# Patient Record
Sex: FEMALE | Race: BLACK OR AFRICAN AMERICAN | Employment: UNEMPLOYED | URBAN - METROPOLITAN AREA
[De-identification: names, ages, dates, MRNs, and addresses within clinical notes are randomized per-mention and may not be internally consistent; named-entity substitution may affect disease eponyms.]

---

## 2020-11-23 ENCOUNTER — CLINICAL DOCUMENTATION (OUTPATIENT)
Dept: GENERAL RADIOLOGY | Age: 40
End: 2020-11-23

## 2021-04-22 ENCOUNTER — HOSPITAL ENCOUNTER (EMERGENCY)
Age: 41
Discharge: HOME | End: 2021-04-22
Attending: EMERGENCY MEDICINE
Payer: MEDICARE

## 2021-04-22 VITALS
BODY MASS INDEX: 28.35 KG/M2 | WEIGHT: 160 LBS | TEMPERATURE: 98.3 F | OXYGEN SATURATION: 95 % | RESPIRATION RATE: 16 BRPM | SYSTOLIC BLOOD PRESSURE: 160 MMHG | HEART RATE: 77 BPM | DIASTOLIC BLOOD PRESSURE: 104 MMHG | HEIGHT: 63 IN

## 2021-04-22 DIAGNOSIS — R03.0 ELEVATED BLOOD PRESSURE READING: ICD-10-CM

## 2021-04-22 DIAGNOSIS — H60.391 INFECTIVE OTITIS EXTERNA OF RIGHT EAR: Primary | ICD-10-CM

## 2021-04-22 PROCEDURE — 99284 EMERGENCY DEPT VISIT MOD MDM: CPT

## 2021-04-22 PROCEDURE — 6370000000 HC RX 637 (ALT 250 FOR IP): Performed by: EMERGENCY MEDICINE

## 2021-04-22 RX ORDER — NEOMYCIN SULFATE, POLYMYXIN B SULFATE AND HYDROCORTISONE 10; 3.5; 1 MG/ML; MG/ML; [USP'U]/ML
3 SUSPENSION/ DROPS AURICULAR (OTIC) ONCE
Status: COMPLETED | OUTPATIENT
Start: 2021-04-22 | End: 2021-04-22

## 2021-04-22 RX ADMIN — NEOMYCIN SULFATE, POLYMYXIN B SULFATE AND HYDROCORTISONE 3 DROP: 10; 3.5; 1 SUSPENSION/ DROPS AURICULAR (OTIC) at 06:05

## 2021-04-22 ASSESSMENT — ENCOUNTER SYMPTOMS
SHORTNESS OF BREATH: 0
COUGH: 0
FACIAL SWELLING: 1
SORE THROAT: 0
TROUBLE SWALLOWING: 0

## 2021-04-22 NOTE — ED PROVIDER NOTES
[address]  EMERGENCY DEPARTMENT ENCOUNTER      Pt Name: Tory Rodriguez  MRN: [id number]  [place] 1980  Date of evaluation: 4/22/21      CHIEF COMPLAINT       Chief Complaint   Patient presents with    Facial Swelling     right ear and face     HISTORY OF PRESENT ILLNESS   HPI 47 y.o. female presents with c/o right ear pain and swelling. Symptoms have been progressively worsening over the last few days. Symptoms rated as severe in severity, constant in course, progressively worsening. Patient did have her right upper molar pulled about 2 weeks ago, but she denies any throat swelling or pain. REVIEW OF SYSTEMS       Review of Systems   Constitutional: Negative for fever. HENT: Positive for ear pain and facial swelling. Negative for dental problem, sore throat and trouble swallowing. Respiratory: Negative for cough and shortness of breath. PAST MEDICAL HISTORY   History reviewed. No pertinent past medical history. SURGICAL HISTORY     History reviewed. No pertinent surgical history. CURRENT MEDICATIONS       Previous Medications    NAPROXEN (NAPROSYN) 500 MG TABLET    Take 1 tablet by mouth 2 times daily for 10 days. OXYCODONE-ACETAMINOPHEN (PERCOCET) 5-325 MG PER TABLET    Take 1 tablet by mouth every 6 hours as needed for Pain for up to 15 doses. ALLERGIES     has No Known Allergies. FAMILY HISTORY     has no family status information on file. SOCIAL HISTORY      reports that she has been smoking cigars. She has been smoking about 0.50 packs per day. She has never used smokeless tobacco. She reports current alcohol use. She reports current drug use. Drug: Marijuana. PHYSICAL EXAM     INITIAL VITALS: BP (!) 160/104   Pulse 77   Temp 98.3 °F (36.8 °C) (Oral)   Resp 16   Ht 5' 3\" (1.6 m)   Wt 160 lb (72.6 kg)   SpO2 95%   BMI 28.34 kg/m²   General: NAD  Head: NCAT  ENT: External ear canal on the right is edematous and occluded. No mastoid tenderness. There is some swelling in the preauricular area. Oropharyngeal area is nonerythematous or edematous. Neck: There is no adenopathy or edema. Cardiovascular RRR  Pulmonary: CTA  Neuro: ANO x3 fluent speech ambulatory with a normal gait  MEDICAL DECISION MAKING:     MDM  47 y.o. female presenting with an infective otitis externa. We will put her on Cortisporin. There is no sign of mastoiditis. She did have a dental procedure a few weeks ago, but she denies any symptoms of throat swelling or pain. D/w pt treatment plan, warning precautions for prompt ED return and importance of close OP FU, she verbalizes understanding and agrees with the treatment plan. DIAGNOSTIC RESULTS       EMERGENCY DEPARTMENT COURSE:   Vitals:    Vitals:    04/22/21 0539 04/22/21 0546   BP:  (!) 160/104   Pulse: 77    Resp: 16    Temp: 98.3 °F (36.8 °C)    TempSrc: Oral    SpO2: 95%    Weight: 160 lb (72.6 kg)    Height: 5' 3\" (1.6 m)        The patient was given the following medications while in the emergency department:  Orders Placed This Encounter   Medications    neomycin-polymyxin-hydrocortisone (CORTISPORIN) otic suspension 3 drop    neomycin-polymyxin-hydrocortisone (CORTISPORIN) 3.5-91667-6 otic solution     Sig: Place 4 drops into the right ear 3 times daily for 10 days Instill into r. ear     Dispense:  1 Bottle     Refill:  0     -------------------------  CRITICAL CARE:   CONSULTS: None  PROCEDURES: Procedures     FINAL IMPRESSION      1. Infective otitis externa of right ear    2.  Elevated blood pressure reading          DISPOSITION/PLAN   DISPOSITION Decision To Discharge 04/22/2021 05:57:36 AM      PATIENT REFERRED TO:  Haider Chand, [address] [id number]    In 2 days      [facility] ED  [facility] [address] [phone]    for ct scan      DISCHARGE MEDICATIONS:  New Prescriptions    NEOMYCIN-POLYMYXIN-HYDROCORTISONE (CORTISPORIN) 3.5-13690-6 OTIC SOLUTION    Place 4 drops into the right ear 3 times daily for 10 days Instill into r. ear         Rocco Ganser, MD  Attending Emergency Physician                      Rocco Ganser, MD  04/22/21 7237